# Patient Record
Sex: MALE | Race: WHITE | NOT HISPANIC OR LATINO | URBAN - METROPOLITAN AREA
[De-identification: names, ages, dates, MRNs, and addresses within clinical notes are randomized per-mention and may not be internally consistent; named-entity substitution may affect disease eponyms.]

---

## 2017-03-25 ENCOUNTER — HOSPITAL ENCOUNTER (EMERGENCY)
Facility: MEDICAL CENTER | Age: 22
End: 2017-03-25
Attending: EMERGENCY MEDICINE
Payer: COMMERCIAL

## 2017-03-25 VITALS
HEART RATE: 90 BPM | DIASTOLIC BLOOD PRESSURE: 80 MMHG | RESPIRATION RATE: 16 BRPM | OXYGEN SATURATION: 97 % | TEMPERATURE: 97.7 F | BODY MASS INDEX: 22.99 KG/M2 | WEIGHT: 138.01 LBS | HEIGHT: 65 IN | SYSTOLIC BLOOD PRESSURE: 139 MMHG

## 2017-03-25 DIAGNOSIS — R12 HEARTBURN: ICD-10-CM

## 2017-03-25 DIAGNOSIS — F10.930 ALCOHOL WITHDRAWAL, UNCOMPLICATED (HCC): ICD-10-CM

## 2017-03-25 LAB — EKG IMPRESSION: NORMAL

## 2017-03-25 PROCEDURE — 700102 HCHG RX REV CODE 250 W/ 637 OVERRIDE(OP): Performed by: EMERGENCY MEDICINE

## 2017-03-25 PROCEDURE — 93005 ELECTROCARDIOGRAM TRACING: CPT

## 2017-03-25 PROCEDURE — 99284 EMERGENCY DEPT VISIT MOD MDM: CPT

## 2017-03-25 PROCEDURE — A9270 NON-COVERED ITEM OR SERVICE: HCPCS | Performed by: EMERGENCY MEDICINE

## 2017-03-25 RX ORDER — CHLORDIAZEPOXIDE HYDROCHLORIDE 25 MG/1
25-75 CAPSULE, GELATIN COATED ORAL 3 TIMES DAILY PRN
Qty: 30 CAP | Refills: 0 | Status: SHIPPED | OUTPATIENT
Start: 2017-03-25

## 2017-03-25 RX ADMIN — LIDOCAINE HYDROCHLORIDE 30 ML: 20 SOLUTION OROPHARYNGEAL at 16:00

## 2017-03-25 ASSESSMENT — PAIN SCALES - GENERAL: PAINLEVEL_OUTOF10: 7

## 2017-03-25 NOTE — ED NOTES
C/o insomnia and heart burn secondary to alcohol with drawl over last 4 days did drink today, ekg done no stemi

## 2017-03-25 NOTE — ED AVS SNAPSHOT
Fathom Online Access Code: [email or web address]  Expires: 4/24/2017  4:01 PM    Your email address is not on file at [employer].  Email Addresses are required for you to sign up for Fathom Online, please contact [phone] to verify your personal information and to provide your email address prior to attempting to register for Fathom Online.    Luc Elizabeth  [address] [place] [address]    Fathom Online  A secure, online tool to manage your health information     [employer]’s Fathom Online® is a secure, online tool that connects you to your personalized health information from the privacy of your home -- day or night - making it very easy for you to manage your healthcare. Once the activation process is completed, you can even access your medical information using the Fathom Online marlon, which is available for free in the Apple Marlon store or Google Play store.     To learn more about Fathom Online, visit [email or web address]    There are two levels of access available (as shown below):   My Chart Features  [facility] Primary Care Doctor [facility]  Specialists [facility]  Urgent  Care [facility] Primary Care Doctor   Email your healthcare team securely and privately 24/7 X X X    Manage appointments: schedule your next appointment; view details of past/upcoming appointments X      Request prescription refills. X      View recent personal medical records, including lab and immunizations X X X X   View health record, including health history, allergies, medications X X X X   Read reports about your outpatient visits, procedures, consult and ER notes X X X X   See your discharge summary, which is a recap of your hospital and/or ER visit that includes your diagnosis, lab results, and care plan X X  X     How to register for Chargebackt:  Once your e-mail address has been verified, follow the following steps to sign up for Chargebackt.     1. Go to  [email or web address]  2. Click on the Sign Up Now box, which takes you to the New Member Sign Up page.  You will need to provide the following information:  a. Enter your Boqii Access Code exactly as it appears at the top of this page. (You will not need to use this code after you’ve completed the sign-up process. If you do not sign up before the expiration date, you must request a new code.)   b. Enter your date of birth.   c. Enter your home email address.   d. Click Submit, and follow the next screen’s instructions.  3. Create a NetClarityt ID. This will be your Boqii login ID and cannot be changed, so think of one that is secure and easy to remember.  4. Create a Boqii password. You can change your password at any time.  5. Enter your Password Reset Question and Answer. This can be used at a later time if you forget your password.   6. Enter your e-mail address. This allows you to receive e-mail notifications when new information is available in Boqii.  7. Click Sign Up. You can now view your health information.    For assistance activating your Boqii account, call [phone]

## 2017-03-25 NOTE — ED PROVIDER NOTES
"ED Provider Note    CHIEF COMPLAINT  Chief Complaint   Patient presents with   • ETOH Withdrawal   • Insomnia   • Heartburn       HPI  Luc Elizabeth is a 21 y.o. male who presents with heartburn and typically sleeping. He does think this is secondary to alcohol withdrawal. He says he thinks he averages about 3 bottles of wine a day and over the last few days he's been trying to cut down. He did take some alcohol this morning to keep his heart rate down. He has had issues with alcohol in the past and is due to go to a rehab facility on Friday. He has taken Librium before and has done well being able to use this to help him stop drinking. He has had heartburn before when he drinks a lot and this is the same symptoms he has had. He used to be on Nexium but is not on this at this time. He says the heartburn gets better when he stops drinking. He denies any vomiting. He is really hoping for Librium to help manage his withdrawal symptoms until he can get to rehab on Friday.    REVIEW OF SYSTEMS  Positive for heartburn insomnia, negative for seizures.     PAST MEDICAL HISTORY   has a past medical history of Alcohol abuse.    SOCIAL HISTORY  Social History     Social History Main Topics   • Smoking status: Current Every Day Smoker -- 0.50 packs/day for 11 years     Types: Cigarettes   • Smokeless tobacco: Never Used   • Alcohol Use: Yes   • Drug Use: No   • Sexual Activity: Not on file       SURGICAL HISTORY  patient denies any surgical history    CURRENT MEDICATIONS  Home Medications     **Home medications have not yet been reviewed for this encounter**          ALLERGIES  Allergies   Allergen Reactions   • Other Food      peanuts       PHYSICAL EXAM  VITAL SIGNS: /90 mmHg  Pulse 105  Temp(Src) 36.5 °C (97.7 °F)  Resp 16  Ht 1.651 m (5' 5\")  Wt 62.6 kg (138 lb 0.1 oz)  BMI 22.97 kg/m2  SpO2 97%  Constitutional: Alert in no apparent distress.  HENT: Normocephalic, Atraumatic, Bilateral external ears " normal. Nose normal.   Eyes: Pupils are equal and reactive. Conjunctiva normal, non-icteric.   Heart: Mildly tachycardic, no murmurs.    Lungs: Clear to auscultation bilaterally.  Skin: Warm, Dry, No erythema, No rash.   Neurologic: Alert, Grossly non-focal.   Psychiatric: Affect normal, Judgment normal, Mood normal, Appears appropriate   Extremities: Intact distal pulses, No edema, No tenderness    DIAGNOSTIC STUDIES / PROCEDURES      COURSE & MEDICAL DECISION MAKING  Pertinent Labs & Imaging studies reviewed. (See chart for details)  This is a 21-year-old with a history of alcohol abuse presenting with heartburn and withdrawal symptoms. He is minimally tachycardic slightly hypertensive, he is otherwise well-appearing he's not delirious. He has taken Librium without issue in the past. He will be given a prescription for Librium and a GI cocktail to help with his heartburn he'll be discharged he's agreeable to this plan.    The patient will not drink alcohol nor drive with prescribed medications. The patient will return for new or worsening symptoms and is stable at the time of discharge. Patient was given return precautions. Patient and/or family member verbalizes understanding and will comply.    DISPOSITION:  Patient will be discharged home in stable condition.    FOLLOW UP:  [facility], Emergency Dept  [address]  [place] [address] [phone]    Return for worsening pain, withdrawal symptoms or other concerns    primary care      please follow-up regarding your blood pressure it was slightly elevated        OUTPATIENT MEDICATIONS:  New Prescriptions    CHLORDIAZEPOXIDE (LIBRIUM) 25 MG CAP    Take 1-3 Caps by mouth 3 times a day as needed for Anxiety (withdrawal symptoms).       Your blood pressure is elevated here in the emergency department. Please monitor your blood pressure over the next several days. If your blood pressure continues to be 120/80 or higher please  contact your physician for blood pressure management.       FINAL IMPRESSION  1. Heartburn    2. Alcohol withdrawal, uncomplicated (CMS-HCC)        2.   3.     This dictation has been creating using voice recognition software. The accuracy of the dictation is limited the abilities of the software.  I expect there may be some errors of grammar and possibly content. I made every attempt to manually correct the errors within my dictation. However errors related to this voice recognition software may still exist and should be interpreted within the appropriate context.        The note accurately reflects work and decisions made by me.  Cherelle Nam  3/25/2017  3:49 PM

## 2017-03-25 NOTE — DISCHARGE INSTRUCTIONS
Alcohol Withdrawal  When a person who drinks a lot of alcohol stops drinking, he or she may go through alcohol withdrawal. Alcohol withdrawal causes problems. It can make you feel:  · Tired (fatigued).  · Sad (depressed).  · Fearful (anxious).  · Grouchy (irritable).  · Not hungry.  · Sick to your stomach (nauseous).  · Shaky.  It can also make you have:  · Nightmares.  · Trouble sleeping.  · Trouble thinking clearly.  · Mood swings.  · Clammy skin.  · Very bad sweating.  · A very fast heartbeat.  · Shaking that you cannot control (tremor).  · Having a fever.  · A fit of movements that you cannot control (seizure).  · Confusion.  · Throwing up (vomiting).  · Feeling or seeing things that are not there (hallucinations).  HOME CARE  · Take medicines and vitamins only as told by your doctor.  · Do not drink alcohol.  · Have someone around in case you need help.  · Drink enough fluid to keep your pee (urine) clear or pale yellow.  · Think about joining a group to help you stop drinking.  GET HELP IF:  · Your problems get worse.  · Your problems do not go away.  · You cannot eat or drink without throwing up.  · You are having a hard time not drinking alcohol.  · You cannot stop drinking alcohol.  GET HELP RIGHT AWAY IF:  · You feel your heart beating differently than usual.  · Your chest hurts.  · You have trouble breathing.  · You have very bad problems, like:  ¨ A fever.  ¨ A fit of movements that you cannot control.  ¨ Being very confused.  ¨ Feeling or seeing things that are not there.     This information is not intended to replace advice given to you by your health care provider. Make sure you discuss any questions you have with your health care provider.     Document Released: 06/05/2009 Document Revised: 01/08/2016 Document Reviewed: 10/06/2015  Jet[employer] Interactive Patient Education ©2016 [employer]    Heartburn  Heartburn is a painful, burning feeling in the chest. It may feel worse when you lie down or bend  over. Heartburn is caused by stomach acid moving into the tube that carries food from the mouth to the stomach (esophagus).  HOME CARE  · Take all medicine as told by your doctor.  · Raise the head of your bed with blocks only as told by your doctor.  · Do not exercise right after eating.  · Avoid eating 2 or 3 hours before bed. Do not lie down right after eating.  · Eat small meals throughout the day instead of 3 large meals.  · Stop smoking if you smoke.  · Keep up a healthy weight.  · Avoid foods that give you heartburn. Foods you may want to avoid include:  ¨ Peppers.  ¨ Chocolate.  ¨ High-fat foods, including fried foods.  ¨ Spicy foods.  ¨ Garlic and onions.  ¨ Citrus fruits, including oranges, grapefruit, tate, and limes.  ¨ Food containing tomatoes or tomato products.  ¨ Mint.  ¨ Bubbly (carbonated) drinks and drinks with caffeine.  ¨ Vinegar.  GET HELP RIGHT AWAY IF:  · You have bad chest pain that goes down your arm or into your jaw or neck.  · You feel sweaty, dizzy, or lightheaded.  · You have trouble breathing.  · You throw up (vomit) blood.  · You have trouble or pain when swallowing.  · You have bloody or black poop (stool).  · You have heartburn more than 3 times a week, for more than 2 weeks.  MAKE SURE YOU:  · Understand these instructions.  · Will watch your condition.  · Will get help right away if you are not doing well or get worse.     This information is not intended to replace advice given to you by your health care provider. Make sure you discuss any questions you have with your health care provider.     Document Released: 08/29/2012 Document Revised: 03/11/2013 Document Reviewed: 04/13/2016  [employer] Interactive Patient Education ©2016 [employer] Inc[employer]

## 2017-03-25 NOTE — ED AVS SNAPSHOT
Home Care Instructions                                                                                                                Luc Elizabeth   MRN: [id number]    Department:  [facility], Emergency Dept   Date of Visit:  3/25/2017            [facility], Emergency Dept    [address]    [place] [address]    Phone:  [phone]      You were seen by     Cherelle Nam M.D.      Your Diagnosis Was     Heartburn     R12       These are the medications you received during your hospitalization from 03/25/2017 1500 to 03/25/2017 1601     Date/Time Order Dose Route Action    03/25/2017 1600 hyoscyamine-maalox plus-lidocaine viscous (GI COCKTAIL) oral susp 30 mL 30 mL Oral Given      Follow-up Information     1. Follow up with [facility], Emergency Dept.    Specialty:  Emergency Medicine    Why:  Return for worsening pain, withdrawal symptoms or other concerns    Contact information    [address] [place] [address] [phone]        2. Follow up with primary care.    Why:  please follow-up regarding your blood pressure it was slightly elevated      Medication Information     Review all of your home medications and newly ordered medications with your primary doctor and/or pharmacist as soon as possible. Follow medication instructions as directed by your doctor and/or pharmacist.     Please keep your complete medication list with you and share with your physician. Update the information when medications are discontinued, doses are changed, or new medications (including over-the-counter products) are added; and carry medication information at all times in the event of emergency situations.               Medication List      START taking these medications        Instructions    Morning Afternoon Evening Bedtime    chlordiazepoxide 25 MG Caps   Commonly known as:  LIBRIUM        Take 1-3 Caps by mouth 3 times a day as  needed for Anxiety (withdrawal symptoms).   Dose:  25-75 mg                             Where to Get Your Medications      You can get these medications from any pharmacy     Bring a paper prescription for each of these medications    - chlordiazepoxide 25 MG Caps            Procedures and tests performed during your visit     EKG (NOW)        Discharge Instructions       Alcohol Withdrawal  When a person who drinks a lot of alcohol stops drinking, he or she may go through alcohol withdrawal. Alcohol withdrawal causes problems. It can make you feel:  · Tired (fatigued).  · Sad (depressed).  · Fearful (anxious).  · Grouchy (irritable).  · Not hungry.  · Sick to your stomach (nauseous).  · Shaky.  It can also make you have:  · Nightmares.  · Trouble sleeping.  · Trouble thinking clearly.  · Mood swings.  · Clammy skin.  · Very bad sweating.  · A very fast heartbeat.  · Shaking that you cannot control (tremor).  · Having a fever.  · A fit of movements that you cannot control (seizure).  · Confusion.  · Throwing up (vomiting).  · Feeling or seeing things that are not there (hallucinations).  HOME CARE  · Take medicines and vitamins only as told by your doctor.  · Do not drink alcohol.  · Have someone around in case you need help.  · Drink enough fluid to keep your pee (urine) clear or pale yellow.  · Think about joining a group to help you stop drinking.  GET HELP IF:  · Your problems get worse.  · Your problems do not go away.  · You cannot eat or drink without throwing up.  · You are having a hard time not drinking alcohol.  · You cannot stop drinking alcohol.  GET HELP RIGHT AWAY IF:  · You feel your heart beating differently than usual.  · Your chest hurts.  · You have trouble breathing.  · You have very bad problems, like:  ¨ A fever.  ¨ A fit of movements that you cannot control.  ¨ Being very confused.  ¨ Feeling or seeing things that are not there.     This information is not intended to replace advice given to  you by your health care provider. Make sure you discuss any questions you have with your health care provider.     Document Released: 06/05/2009 Document Revised: 01/08/2016 Document Reviewed: 10/06/2015  [employer] Interactive Patient Education ©2016 Elsevier Inc.    Heartburn  Heartburn is a painful, burning feeling in the chest. It may feel worse when you lie down or bend over. Heartburn is caused by stomach acid moving into the tube that carries food from the mouth to the stomach (esophagus).  HOME CARE  · Take all medicine as told by your doctor.  · Raise the head of your bed with blocks only as told by your doctor.  · Do not exercise right after eating.  · Avoid eating 2 or 3 hours before bed. Do not lie down right after eating.  · Eat small meals throughout the day instead of 3 large meals.  · Stop smoking if you smoke.  · Keep up a healthy weight.  · Avoid foods that give you heartburn. Foods you may want to avoid include:  ¨ Peppers.  ¨ Chocolate.  ¨ High-fat foods, including fried foods.  ¨ Spicy foods.  ¨ Garlic and onions.  ¨ Citrus fruits, including oranges, grapefruit, tate, and limes.  ¨ Food containing tomatoes or tomato products.  ¨ Mint.  ¨ Bubbly (carbonated) drinks and drinks with caffeine.  ¨ Vinegar.  GET HELP RIGHT AWAY IF:  · You have bad chest pain that goes down your arm or into your jaw or neck.  · You feel sweaty, dizzy, or lightheaded.  · You have trouble breathing.  · You throw up (vomit) blood.  · You have trouble or pain when swallowing.  · You have bloody or black poop (stool).  · You have heartburn more than 3 times a week, for more than 2 weeks.  MAKE SURE YOU:  · Understand these instructions.  · Will watch your condition.  · Will get help right away if you are not doing well or get worse.     This information is not intended to replace advice given to you by your health care provider. Make sure you discuss any questions you have with your health care provider.     Document  Released: 08/29/2012 Document Revised: 03/11/2013 Document Reviewed: 04/13/2016  Elsevier Interactive Patient Education ©2016 [employer]            Patient Information     Patient Information    Following emergency treatment: all patient requiring follow-up care must return either to a private physician or a clinic if your condition worsens before you are able to obtain further medical attention, please return to the emergency room.     Billing Information    At [facility], we work to make the billing process streamlined for our patients.  Our Representatives are here to answer any questions you may have regarding your hospital bill.  If you have insurance coverage and have supplied your insurance information to us, we will submit a claim to your insurer on your behalf.  Should you have any questions regarding your bill, we can be reached online or by phone as follows:  Online: You are able pay your bills online or live chat with our representatives about any billing questions you may have. We are here to help Monday - Friday from 8:00am to 7:30pm and 9:00am - 12:00pm on Saturdays.  Please visit [email or web address]  for more information.   Phone:  [phone] or [phone]    Please note that your emergency physician, surgeon, pathologist, radiologist, anesthesiologist, and other specialists are not employed by [facility] and will therefore bill separately for their services.  Please contact them directly for any questions concerning their bills at the numbers below:     Emergency Physician Services:  [phone]  [facility]:  [phone]  [facility]:  [phone]  [facility]:  [phone]    1. Your final bill may vary from the amount quoted upon discharge if all procedures are not complete at that time, or if your doctor has additional procedures of which we are not aware. You will receive an additional bill if you  return to the Emergency Department at [facility] for suture removal regardless of the facility of which the sutures were placed.     2. Please arrange for settlement of this account at the emergency registration.    3. All self-pay accounts are due in full at the time of treatment.  If you are unable to meet this obligation then payment is expected within 4-5 days.     4. If you have had radiology studies (CT, X-ray, Ultrasound, MRI), you have received a preliminary result during your emergency department visit. Please contact the radiology department [phone] to receive a copy of your final result. Please discuss the Final result with your primary physician or with the follow up physician provided.     Crisis Hotline:  [place] Crisis Hotline:  1-731-SKMAYXD or 1-819.195.3513  [place] Crisis Hotline:    1-905.399.8927 or 877-420-5365         ED Discharge Follow Up Questions    1. In order to provide you with very good care, we would like to follow up with a phone call in the next few days.  May we have your permission to contact you?     YES /  NO    2. What is the best phone number to call you? (       )_____-__________    3. What is the best time to call you?      Morning  /  Afternoon  /  Evening                   Patient Signature:  ____________________________________________________________    Date:  ____________________________________________________________

## 2017-03-25 NOTE — ED AVS SNAPSHOT
3/25/2017          Luc Elizabeth  [address] [address]    Dear Luc:    [facility] wants to ensure your discharge home is safe and you or your loved ones have had all your questions answered regarding your care after you leave the hospital.    You may receive a telephone call within two days of your discharge.  This call is to make certain you understand your discharge instructions as well as ensure we provided you with the best care possible during your stay with us.     The call will only last approximately 3-5 minutes and will be done by a nurse.    Once again, we want to ensure your discharge home is safe and that you have a clear understanding of any next steps in your care.  If you have any questions or concerns, please do not hesitate to contact us, we are here for you.  Thank you for choosing [facility] for your healthcare needs.    Sincerely,    Sushant Link    [facility]